# Patient Record
Sex: MALE | Race: WHITE | NOT HISPANIC OR LATINO | Employment: FULL TIME | URBAN - METROPOLITAN AREA
[De-identification: names, ages, dates, MRNs, and addresses within clinical notes are randomized per-mention and may not be internally consistent; named-entity substitution may affect disease eponyms.]

---

## 2022-03-02 ENCOUNTER — TRANSFERRED RECORDS (OUTPATIENT)
Dept: HEALTH INFORMATION MANAGEMENT | Facility: CLINIC | Age: 67
End: 2022-03-02

## 2023-09-21 ENCOUNTER — TRANSFERRED RECORDS (OUTPATIENT)
Dept: HEALTH INFORMATION MANAGEMENT | Facility: CLINIC | Age: 68
End: 2023-09-21

## 2024-01-22 ENCOUNTER — TRANSCRIBE ORDERS (OUTPATIENT)
Dept: OTHER | Age: 69
End: 2024-01-22

## 2024-01-22 DIAGNOSIS — G93.9 DISORDER OF BRAIN, UNSPECIFIED: Primary | ICD-10-CM

## 2024-01-23 NOTE — TELEPHONE ENCOUNTER
Records Requested     January 23, 2024 8:47 AM   [id number]   Facility  [facility]   Outcome 8:53 am Sent request for appointment notes and any imaging to be pushed to PACS. -JA     Records Requested     January 23, 2024 9:02 AM   [id number]   Facility  [facility]    Outcome 9:05 am Sent request for imaging to be pushed to PACS. -JALEN Barnes on 1/29/2024 at 1:48 PM Imaging disc received and sent to 4N for processing. -JA     Records Requested     January 23, 2024 9:02 AM   [id number]   Facility  [facility]   Outcome 9:06 am Sent request for imaging to be pushed to PACS. -JA    Imaging resolved into PACS. -JA     Records Requested     January 23, 2024 9:02 AM   [id number]   Facility  [facility]    Outcome 9:06 am Sent request for imaging to be pushed to PACS. -JALEN Barnes on 2/13/2024 at 4:24 PM Imaging resolved into PACS -JA     RECORDS RECEIVED FROM: Care Everywhere   REASON FOR VISIT: Brain tumor   Date of Appt: 3/22/24 @ 12:30 pm    NOTES (FOR ALL VISITS) STATUS DETAILS   OFFICE NOTE from referring provider Received 1/22/23 (Transcribed Orders) Emily Ordonez APRN CNP @[facility]     OFFICE NOTE from other specialist Care Everywhere 9/28/23 Zechariah Turcios MD  @[facility]    3/7/16, 11/24/15 Iglesia Mojica MD @[facility]     DISCHARGE SUMMARY from hospital Internal 1/20/16-1/23/16 Iglesia Mojica MD @[facility]     OPERATIVE REPORT Internal 1/20/16 Iglesia Mojica MD @[facility] Left Retromastoid Craniotomy And Tumor Resection      MEDICATION LIST Internal    IMAGING  (FOR ALL VISITS)     MRI (HEAD, NECK, SPINE) PACS [facility]  9/21/23 MR Brain  7/5/22 MR Brain    [facility]  7/17/20 MR Brain    [facility]  3/25/16 MR Brain

## 2024-02-13 ENCOUNTER — TELEPHONE (OUTPATIENT)
Dept: NEUROSURGERY | Facility: CLINIC | Age: 69
End: 2024-02-13
Payer: COMMERCIAL

## 2024-02-14 ENCOUNTER — TELEPHONE (OUTPATIENT)
Dept: NEUROSURGERY | Facility: CLINIC | Age: 69
End: 2024-02-14
Payer: COMMERCIAL

## 2024-02-15 NOTE — TELEPHONE ENCOUNTER
RECORDS RECEIVED FROM: Care Everywhere   REASON FOR VISIT: Epidermoid cyst of brain   PROVIDER: Viola Mendez PA-C   DATE OF APPT: 3/15/24 @ 1:30 pm (moved from 3/22/24)   NOTES (FOR ALL VISITS) STATUS DETAILS   OFFICE NOTE from referring provider Received 1/22/23 (Transcribed Orders) Emily Ordonez APRN CNP @[facility]      OFFICE NOTE from other specialist Care Everywhere 9/28/23 Zechariah Turcios MD  @[facility]     3/7/16, 11/24/15 Iglesia Mojica MD @[facility]     DISCHARGE SUMMARY from hospital Internal 1/20/16-1/23/16 Iglesia Mojica MD @[facility]      OPERATIVE REPORT Internal 1/20/16 Iglesia Mojica MD @[facility] Left Retromastoid Craniotomy And Tumor Resection    MEDICATION LIST Internal    IMAGING  (FOR ALL VISITS)     MRI (HEAD, NECK, SPINE) PACS [facility]  9/21/23 MR Brain  7/5/22 MR Brain     [facility]  7/17/20 MR Brain     [facility]  3/25/16 MR Brain

## 2024-03-15 ENCOUNTER — PRE VISIT (OUTPATIENT)
Dept: NEUROSURGERY | Facility: CLINIC | Age: 69
End: 2024-03-15
Payer: COMMERCIAL

## 2024-03-15 ENCOUNTER — OFFICE VISIT (OUTPATIENT)
Dept: NEUROSURGERY | Facility: CLINIC | Age: 69
End: 2024-03-15
Attending: NURSE PRACTITIONER
Payer: COMMERCIAL

## 2024-03-15 VITALS
HEART RATE: 65 BPM | BODY MASS INDEX: 28.14 KG/M2 | WEIGHT: 190 LBS | DIASTOLIC BLOOD PRESSURE: 81 MMHG | SYSTOLIC BLOOD PRESSURE: 166 MMHG | RESPIRATION RATE: 16 BRPM | HEIGHT: 69 IN | OXYGEN SATURATION: 98 %

## 2024-03-15 DIAGNOSIS — G93.0 EPIDERMOID CYST OF BRAIN: Primary | ICD-10-CM

## 2024-03-15 DIAGNOSIS — R26.9 GAIT ABNORMALITY: ICD-10-CM

## 2024-03-15 DIAGNOSIS — G93.9 DISORDER OF BRAIN, UNSPECIFIED: ICD-10-CM

## 2024-03-15 DIAGNOSIS — R26.89 IMBALANCE: ICD-10-CM

## 2024-03-15 PROCEDURE — 99204 OFFICE O/P NEW MOD 45 MIN: CPT | Performed by: PHYSICIAN ASSISTANT

## 2024-03-15 RX ORDER — METOPROLOL SUCCINATE 50 MG/1
TABLET, EXTENDED RELEASE ORAL
COMMUNITY
Start: 2023-09-11

## 2024-03-15 RX ORDER — APIXABAN 5 MG/1
5 TABLET, FILM COATED ORAL 2 TIMES DAILY
COMMUNITY

## 2024-03-15 RX ORDER — TRIAMCINOLONE ACETONIDE 1 MG/G
CREAM TOPICAL
COMMUNITY

## 2024-03-15 RX ORDER — SPIRONOLACTONE 25 MG/1
TABLET ORAL
COMMUNITY
Start: 2023-09-11

## 2024-03-15 ASSESSMENT — PAIN SCALES - GENERAL: PAINLEVEL: NO PAIN (0)

## 2024-03-15 NOTE — PROGRESS NOTES
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 His last follow up in our department was 3/2016 following his second resection with Dr. Iglesia Mojica. At that time he was instructed to follow up in 1 year with repeat MRI. He had a subsequent brain MRI done in  and then annually since that time with reasonably stable findings. Of note, he saw Neurosurgery through [facility] in  who recommended 2 year follow up. Taz is here today with his wife and daughter due to worsening of imbalance over the past 6 months. He has horizontal nystagmus and left sided tinnitus which he also thinks is getting worse. The imbalance became noticeable 2 years ago though again he feels this has progressed in the past 6 months. It is most noticeable when he walks for any distance. He feels he begins to \"veer\" in one direction or the other. He denies dizziness, vertigo, headaches, or facial droop. He's had hearing loss bilaterally and has bilateral hearing aides. The tinnitus is occurring \"80%\" of the time now, which seems worse to him than before. His wife is quite concerned that his epidermoid is responsible for his worsening balance.      He did see Neurology in , they ordered lab work which was all quite reassuring. He did not have LP at that time, that was recommended. He is participating in PT for his imbalance " recently.    Review of Systems:   Pertinent items are noted in HPI or as in patient entered ROS below, remainder of complete ROS is negative.     Active Medications:     Current Outpatient Medications:     acetaminophen (TYLENOL) 500 MG tablet, Take 2 tablets (1,000 mg) by mouth every 8 hours as needed for mild pain, Disp: , Rfl:     AMLODIPINE BESYLATE PO, Take 10 mg by mouth daily, Disp: , Rfl:     cetirizine (ZYRTEC) 10 MG tablet, Take 10 mg by mouth daily, Disp: , Rfl:     dexamethasone (DECADRON) 2 MG tablet, Take 1 tablet (2 mg) by mouth 2 times daily (with meals), Disp: 14 tablet, Rfl: 0    ELIQUIS ANTICOAGULANT 5 MG tablet, Take 5 mg by mouth 2 times daily, Disp: , Rfl:     LOSARTAN POTASSIUM PO, Take 50 mg by mouth daily, Disp: , Rfl:     metoprolol succinate ER (TOPROL XL) 50 MG 24 hr tablet, , Disp: , Rfl:     oxyCODONE (ROXICODONE) 5 MG immediate release tablet, Take 1-2 tablets (5-10 mg) by mouth every 3 hours as needed for moderate to severe pain, Disp: 90 tablet, Rfl: 0    senna-docusate (SENOKOT-S;PERICOLACE) 8.6-50 MG per tablet, Take 1 tablet by mouth 2 times daily, Disp: 60 tablet, Rfl: 0    spironolactone (ALDACTONE) 25 MG tablet, , Disp: , Rfl:     triamcinolone (KENALOG) 0.1 % external cream, , Disp: , Rfl:     triamterene-hydrochlorothiazide (DYAZIDE) 37.5-25 MG per capsule, Take by mouth daily, Disp: , Rfl:       Allergies:   Patient has no known allergies.      Past Medical History:  Past Medical History:   Diagnosis Date    Hypertension      Patient Active Problem List   Diagnosis    Epidermoid cyst of brain    Brain mass        Past Surgical History:  Past Surgical History:   Procedure Laterality Date    COLONOSCOPY      CRANIOTOMY      CRANIOTOMY, RETROMASTOID Left 1/20/2016    Procedure: CRANIOTOMY, RETROMASTOID;  Surgeon: Iglesia Mojica MD;  Location:  OR       Family History:   No family history on file.      Social History:   Social History     Tobacco Use    Smoking status:  "Former   Substance Use Topics    Alcohol use: Yes    Drug use: No        Physical Exam:   BP (!) 166/81 (BP Location: Right arm, Patient Position: Sitting)   Pulse 65   Resp 16   Ht 1.753 m (5' 9\")   Wt 86.2 kg (190 lb)   SpO2 98%   BMI 28.06 kg/m     General: No acute distress.   Eyes: Conjunctivae are normal.  MSK: Moves all extremities.  No obvious deformity.  Neuro: The patient is fully oriented. Speech is normal. Extraocular movements are intact with horizontal nystagmus, most pronounced with left gaze. Facial sensation is intact in V1, V2, V3 distributions. Facial nerve function is normal, rated as a House Brackmann 1. Shoulders are full strength. There is no pronator drift. Full strength in all extremities. Sensation intact throughout. No dysmetria with finger-nose-finger testing. Gait is normal. Cannot complete heel-toe walking 2/2 imbalance.  Psych: Normal mood and affect. Behavior is normal.      Imaging:  I reviewed his recent imaging and compared to previous this appears stable.     \"IMPRESSION:   1. Stable resection changes left cerebellar pontine angle and left cerebellum   with small residual area of restricted diffusion unchanged from prior involving   the inferior left brachium pontis most compatible with some residual epidermoid   cyst.   2. No acute intracranial abnormality seen today.   3. Stable extensive sequelae of chronic microvascular disease. 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 Mojica regarding next steps. I'll be in touch with the patient and family.    Addendum:  Discussed with Dr. Mojica, who recommended Neurology referral, repeat brain MRI, and follow up with Skull Base neurosurgeon, Dr. Tobias. Orders placed, patient notified.       Viola Mendez PA-C  Department of Neurosurgery  Center [facility] Sk[facility] Base [facility] Pituitary [facility]  [facility] [place]

## 2024-03-19 ENCOUNTER — TELEPHONE (OUTPATIENT)
Dept: NEUROSURGERY | Facility: CLINIC | Age: 69
End: 2024-03-19
Payer: COMMERCIAL

## 2024-03-19 NOTE — TELEPHONE ENCOUNTER
[facility] Call Center    Phone Message    May a detailed message be left on voicemail: yes     Reason for Call: Other: Pt called to schedule appt with Viola but didn't realize the clinic hadn't scheduled the MRI for him already so he did not schedule with Viola but is calling the [facility] to [facility] tomorrow and will call clinic back  to schedule. Pt was also confused about the referral from Viola for neurology and wanted to know if it is the same thing they had talked about at his appt. Please call back to give some clarification on reason for neurology referral      Action Taken: Message routed to:  [facility] ([facility] Neurosurgery    Travel Screening: Not Applicable

## 2024-03-21 ENCOUNTER — TELEPHONE (OUTPATIENT)
Dept: NEUROSURGERY | Facility: CLINIC | Age: 69
End: 2024-03-21
Payer: COMMERCIAL

## 2024-03-21 NOTE — TELEPHONE ENCOUNTER
[facility] Call Center    Phone Message    May a detailed message be left on voicemail: yes     Reason for Call: Other: Patient is wanting his MRI orders [facility] to the [facility] to have them [facility] there. Please call patient when the orders have been faxed over.      Action Taken: Other: Neurosurgery    Travel Screening: Not Applicable

## 2024-03-21 NOTE — TELEPHONE ENCOUNTER
Writer informed pt that he needed to see neurology for his gait imbalance and provided their phone number for him to schedule his consult with them.     Pt also concerned about getting his MRI before his appointment with Viola Mendez because he cannot drive to [place], he would like his MRI to be completed at the [facility] in [place]. Writer will fax RFS to [facility].

## 2024-03-21 NOTE — TELEPHONE ENCOUNTER
Left Voicemail (1st Attempt) for the patient to call back and schedule the following:    Appointment type: Return skull base  Provider: Jatinder  Return date: After [facility] at the [facility]  Specialty phone number: [phone]  Additional appointment(s) needed: NA  Additonal Notes: In person or virtual

## 2024-03-22 ENCOUNTER — PRE VISIT (OUTPATIENT)
Dept: NEUROSURGERY | Facility: CLINIC | Age: 69
End: 2024-03-22

## 2024-03-27 NOTE — TELEPHONE ENCOUNTER
RFS faxed to [facility] [phone]    DM (diabetes mellitus)    MI (myocardial infarction)

## 2024-04-30 ENCOUNTER — TRANSFERRED RECORDS (OUTPATIENT)
Dept: HEALTH INFORMATION MANAGEMENT | Facility: CLINIC | Age: 69
End: 2024-04-30
Payer: COMMERCIAL

## 2024-05-07 ENCOUNTER — CARE COORDINATION (OUTPATIENT)
Dept: NEUROSURGERY | Facility: CLINIC | Age: 69
End: 2024-05-07
Payer: COMMERCIAL

## 2024-05-08 ENCOUNTER — CARE COORDINATION (OUTPATIENT)
Dept: NEUROSURGERY | Facility: CLINIC | Age: 69
End: 2024-05-08
Payer: COMMERCIAL

## 2024-05-08 ENCOUNTER — OFFICE VISIT (OUTPATIENT)
Dept: NEUROSURGERY | Facility: CLINIC | Age: 69
End: 2024-05-08
Payer: COMMERCIAL

## 2024-05-08 VITALS
SYSTOLIC BLOOD PRESSURE: 165 MMHG | HEIGHT: 69 IN | BODY MASS INDEX: 28.29 KG/M2 | DIASTOLIC BLOOD PRESSURE: 96 MMHG | WEIGHT: 191 LBS | OXYGEN SATURATION: 99 % | HEART RATE: 59 BPM

## 2024-05-08 DIAGNOSIS — G93.0 EPIDERMOID CYST OF BRAIN: Primary | ICD-10-CM

## 2024-05-08 PROCEDURE — 99215 OFFICE O/P EST HI 40 MIN: CPT | Performed by: NEUROLOGICAL SURGERY

## 2024-05-08 ASSESSMENT — PAIN SCALES - GENERAL: PAINLEVEL: NO PAIN (0)

## 2024-05-08 NOTE — PROGRESS NOTES
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 Bah,  Dr. Mojica), follow up visit     Dear Dr. Turcios,     It was a pleasure to see Mr. Watts back in the Center for Skull Base and Pituitary Surgery today in follow up.  As you recall, Mr. Watts is a 68 year old right-handed male with a history of hypertension, atrial fibrillation (on Eliquis), sensorineural bilateral hearing loss, and alcohol use who is here today for follow up regarding a left cerebellopontine angle epidermoid cyst which was resected in  and again in  for recurrence. His last follow up in our department was 3/2016 following his second resection with Dr. Iglesia Mojica. At that time he was instructed to follow up in 1 year with repeat MRI. He had a subsequent brain MRI done in  and then annually since that time with reasonably stable findings. Of note, he saw Neurosurgery through [facility] in  who recommended 2 year follow up.     Today her reports persistent hearing loss, balance issues, as well as headaches. Has also been dealing with dysphagia following his second left CPA resection in . He states that is vocal cord was paralyzed after the procedure.  He is also having double vision and his eye doctor noted nystagmus.  He was seen by a neurologist and told he has neuropathy in his legs.      Review of Systems:   Pertinent items are noted in HPI, remainder of complete ROS is negative.      Active Medications:   Acetaminophen, Amlodipine, Cetirizine, Dexamethasone, Eliquis, Losartan, Metoprolol, Oxycodone, Senna-Docusate, Spironolactone, Triamcinolone, Triamterene-Hydrochlorothiazide     Allergies:   No know drug allergies     Past Medical History:   HTN     Past Surgical History:   Colonoscopy, Craniotomy; retromastoid,  "    Family History:   No reported family history      Social History:   Born and raised in [place].  with 3 kids who are in the area. 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      Physical Exam:   BP (!) 165/96 (BP Location: Left arm, Patient Position: Sitting, Cuff Size: Adult Large)   Pulse 59   Ht 1.753 m (5' 9\")   Wt 86.6 kg (191 lb)   SpO2 99%   BMI 28.21 kg/m       General: No acute distress.   Head: No signs of trauma.    Eyes: Conjunctivae are normal.  Mouth/Throat: Oropharynx moist.  Neck: Normal range of motion.    Resp: No respiratory distress.   MSK: Moves all extremities.  No obvious deformity.  Neuro: The patient is fully oriented and quite pleasant. Speech normal. Double vision.  Rotatory nystagmus accentuated with lateral gaze and upgaze.  Facial sensation is intact in V1, V2, V3 distributions. Facial nerve function is normal, rated as a House Brackmann 1, without synkinesis.  Hearing is absent on the left. Palate is symmetric. Shoulders are full strength. Tongue is midline. Palate deviates to the right.There is no pronator drift. Full strength in all extremities. Sensation intact throughout. No dysmetria with finger-nose-finger testing. Gait has is normal-based but is slowed and he cannot tandem walk.   Psych: Normal mood and affect. Behavior is normal.    Incision: Theris a reverse hockey stick incision behind the left ear extending to the midline.    Imaging:  We reviewed his recent MRI from 9/21/2023 and compared this to prior studies.  There is a small residual diffusion restricting mass along the aspect of the left medulla and bottom of the cerebellar peduncle with limited mass effect.  This is much improved compared to the preoperative MRI in 2015.    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  Given how small the lesion is and the lack of progress over the past year, it was hard to definitively know if this is related to his symptoms.  The timeline is unclear.  I communicated that I did not think that operating on this was likely to relieve any of his symptoms.  They seem to understand this after we reviewed the MRI.    He did have a more recent MRI but we are unable to locate this.  We will try again or obtain new MRI imaging so we can review this.    If the recent MRI is reassuring, I would like to have him see my team back in 1 year with an updated appointment and MRI with my partner Viola Mendez.    He [facility] to [facility] for his workup of neuropathy.  He is having some choking episodes that are mildly worse than years past, so given his vocal cord paralysis, I recommended that he revisit with ENT at the [facility] who have seen him before so that they can perform another laryngoscopy to compare to his prior results.  He will pursue this at his [facility] in [place].  I encouraged him to contact us should any questions or concerns arise in advance of his next appointment      It has been a pleasure to participate in the care of your patient. Please feel free to contact us if we may be of any assistance for Mr. Watts.    Frederic Tobias MD   Department of Neurosurgery  Center for Skull Base and Pituitary Surgery  [facility]      40 minutes spent on the date of the encounter doing chart review, review of outside records, review of test results, interpretation of tests, patient visit, documentation and discussion with other provider(s)         Scribe Disclosure:   I, Guillermo Stearns, am serving as a scribe; to document services personally performed by Frederic Tobias MD -based on data collection and the provider's statements to me.

## 2024-05-08 NOTE — LETTER
2024       RE: Taz Watts   [address] [place] [address]       Dear Colleague,    Thank you for referring your patient, Taz Watts, to the [facility] [place] at [facility]. Please see a copy of my visit note below.    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 Bah,  Dr. Mojica), follow up visit     Dear Dr. Turcios,     It was a pleasure to see Mr. Watts back in the Center for Skull Base and Pituitary Surgery today in follow up.  As you recall, Mr. Watts is a 68 year old right-handed male with a history of hypertension, atrial fibrillation (on Eliquis), sensorineural bilateral hearing loss, and alcohol use who is here today for follow up regarding a left cerebellopontine angle epidermoid cyst which was resected in  and again in  for recurrence. His last follow up in our department was 3/2016 following his second resection with Dr. Iglesia Mojica. At that time he was instructed to follow up in 1 year with repeat MRI. He had a subsequent brain MRI done in  and then annually since that time with reasonably stable findings. Of note, he saw Neurosurgery through [facility] in  who recommended 2 year follow up.     Today her reports persistent hearing loss, balance issues, as well as headaches. Has also been dealing with dysphagia following his second left CPA resection in . He states that is vocal cord was paralyzed after the procedure.  He is also having double vision and his eye doctor noted nystagmus.  He was seen by a neurologist and told he has neuropathy in his legs.      Review of Systems:   Pertinent items are noted in HPI, remainder of complete ROS is negative.      Active Medications:  "  Acetaminophen, Amlodipine, Cetirizine, Dexamethasone, Eliquis, Losartan, Metoprolol, Oxycodone, Senna-Docusate, Spironolactone, Triamcinolone, Triamterene-Hydrochlorothiazide     Allergies:   No know drug allergies     Past Medical History:   HTN     Past Surgical History:   Colonoscopy, Craniotomy; retromastoid,     Family History:   No reported family history      Social History:   Born and raised in [place].  with 3 kids who are in the area. 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      Physical Exam:   BP (!) 165/96 (BP Location: Left arm, Patient Position: Sitting, Cuff Size: Adult Large)   Pulse 59   Ht 1.753 m (5' 9\")   Wt 86.6 kg (191 lb)   SpO2 99%   BMI 28.21 kg/m       General: No acute distress.   Head: No signs of trauma.    Eyes: Conjunctivae are normal.  Mouth/Throat: Oropharynx moist.  Neck: Normal range of motion.    Resp: No respiratory distress.   MSK: Moves all extremities.  No obvious deformity.  Neuro: The patient is fully oriented and quite pleasant. Speech normal. Double vision.  Rotatory nystagmus accentuated with lateral gaze and upgaze.  Facial sensation is intact in V1, V2, V3 distributions. Facial nerve function is normal, rated as a House Brackmann 1, without synkinesis.  Hearing is absent on the left. Palate is symmetric. Shoulders are full strength. Tongue is midline. Palate deviates to the right.There is no pronator drift. Full strength in all extremities. Sensation intact throughout. No dysmetria with finger-nose-finger testing. Gait has is normal-based but is slowed and he cannot tandem walk.   Psych: Normal mood and affect. Behavior is normal.    Incision: Theris a reverse hockey stick incision behind the left ear extending to the midline.    Imaging:  We reviewed his recent MRI from 9/21/2023 and compared this to prior studies.  There is a small residual diffusion restricting mass along the aspect of the left medulla and bottom of the cerebellar " peduncle with limited mass effect.  This is much improved compared to the preoperative MRI in 2015.    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  Given how small the lesion is and the lack of progress over the past year, it was hard to definitively know if this is related to his symptoms.  The timeline is unclear.  I communicated that I did not think that operating on this was likely to relieve any of his symptoms.  They seem to understand this after we reviewed the MRI.    He did have a more recent MRI but we are unable to locate this.  We will try again or obtain new MRI imaging so we can review this.    If the recent MRI is reassuring, I would like to have him see my team back in 1 year with an updated appointment and MRI with my partner Viola Mendez.    He continues to [facility] the [facility] for his workup of neuropathy.  He is having some choking episodes that are mildly worse than years past, so given his vocal cord paralysis, I recommended that he revisit with ENT at the [facility] who have seen him before so that they can perform another laryngoscopy to compare to his prior results.  He will pursue this at his [facility] in [place].  I encouraged him to contact us should any questions or concerns arise in advance of his next appointment      It has been a pleasure to participate in the care of your patient. Please feel free to contact us if we may be of any assistance for Mr. Watts.       40 minutes spent on the date of the encounter doing chart review, review of outside records, review of test results, interpretation of tests, patient visit, documentation and discussion with other provider(s)         Scribe Disclosure:   I, Guillermo Stearns, am serving as a scribe; to document services personally  performed by Frederic Tobias MD -based on data collection and the provider's statements to me.         Again, thank you for allowing me to participate in the care of your patient.      Sincerely,    Frederic Tobias MD

## 2024-05-08 NOTE — LETTER
[facility]  [facility] [address]  3RD FLOOR  [facility] [address]  Phone: [phone]  Fax: [phone]          2024    RE:   Taz Watts   [address] [place] [address]      Dear Colleague,    Thank you for referring your patient, Taz Watts, to the [facility]. Please see a copy of my visit note below.      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 Bah,  Dr. Mojica), follow up visit     Dear Dr. Turcios,     It was a pleasure to see Mr. Watts back in the [facility] today in follow up.  As you recall, Mr. Watts is a 68 year old right-handed male with a history of hypertension, atrial fibrillation (on Eliquis), sensorineural bilateral hearing loss, and alcohol use who is here today for follow up regarding a left cerebellopontine angle epidermoid cyst which was resected in  and again in  for recurrence. His last follow up in our department was 3/2016 following his second resection with Dr. Iglesia Mojica. At that time he was instructed to follow up in 1 year with repeat MRI. He had a subsequent brain MRI done in  and then annually since that time with reasonably stable findings. Of note, he saw Neurosurgery through [facility] in  who recommended 2 year follow up.     Today her reports persistent hearing loss, balance issues, as well as headaches. Has also been dealing with dysphagia following his second left CPA resection in . He states that is vocal cord was paralyzed after the procedure.  He is also having double vision and his eye doctor noted nystagmus.  He was seen by a neurologist and told he has  "neuropathy in his legs.      Review of Systems:   Pertinent items are noted in HPI, remainder of complete ROS is negative.      Active Medications:   Acetaminophen, Amlodipine, Cetirizine, Dexamethasone, Eliquis, Losartan, Metoprolol, Oxycodone, Senna-Docusate, Spironolactone, Triamcinolone, Triamterene-Hydrochlorothiazide     Allergies:   No know drug allergies     Past Medical History:   HTN     Past Surgical History:   Colonoscopy, Craniotomy; retromastoid,     Family History:   No reported family history      Social History:   Born and raised in [place].  with 3 kids who are in the area. Formerly in the [employer] [employer]. Now he installs sheet-rock.      Physical Exam:   BP (!) 165/96 (BP Location: Left arm, Patient Position: Sitting, Cuff Size: Adult Large)   Pulse 59   Ht 1.753 m (5' 9\")   Wt 86.6 kg (191 lb)   SpO2 99%   BMI 28.21 kg/m       General: No acute distress.   Head: No signs of trauma.    Eyes: Conjunctivae are normal.  Mouth/Throat: Oropharynx moist.  Neck: Normal range of motion.    Resp: No respiratory distress.   MSK: Moves all extremities.  No obvious deformity.  Neuro: The patient is fully oriented and quite pleasant. Speech normal. Double vision.  Rotatory nystagmus accentuated with lateral gaze and upgaze.  Facial sensation is intact in V1, V2, V3 distributions. Facial nerve function is normal, rated as a House Brackmann 1, without synkinesis.  Hearing is absent on the left. Palate is symmetric. Shoulders are full strength. Tongue is midline. Palate deviates to the right.There is no pronator drift. Full strength in all extremities. Sensation intact throughout. No dysmetria with finger-nose-finger testing. Gait has is normal-based but is slowed and he cannot tandem walk.   Psych: Normal mood and affect. Behavior is normal.    Incision: Theris a reverse hockey stick incision behind the left ear extending to the midline.    Imaging:  We reviewed his recent MRI from 9/21/2023 and " compared this to prior studies.  There is a small residual diffusion restricting mass along the aspect of the left medulla and bottom of the cerebellar peduncle with limited mass effect.  This is much improved compared to the preoperative MRI in 2015.    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  Given how small the lesion is and the lack of progress over the past year, it was hard to definitively know if this is related to his symptoms.  The timeline is unclear.  I communicated that I did not think that operating on this was likely to relieve any of his symptoms.  They seem to understand this after we reviewed the MRI.    He did have a more recent MRI but we are unable to locate this.  We will try again or obtain new MRI imaging so we can review this.    If the recent MRI is reassuring, I would like to have him see my team back in 1 year with an updated appointment and MRI with my partner Viola Mendez.    [facility].  He is having some choking episodes that are mildly worse than years past, so given his vocal cord paralysis, I recommended that he revisit [facility] who have [facility] before so that they can perform another laryngoscopy to compare to his prior results.  [facility] [place].  I encouraged him to contact us should any questions or concerns arise in advance of his next appointment      It has been a pleasure to participate in the care of your patient. Please feel free to contact us if we may be of any assistance for Mr. Watts.    Frederic Tobias MD   Department of Neurosurgery  [facility]  [facility]      40 minutes spent on the date of the  encounter doing chart review, review of outside records, review of test results, interpretation of tests, patient visit, documentation and discussion with other provider(s)         Scribe Disclosure:   I, Guillermo Stearns, am serving as a scribe; to document services personally performed by Frederic Tobias MD -based on data collection and the provider's statements to me.         Again, thank you for allowing me to participate in the care of your patient.      Sincerely,    Frederic Tobias MD

## 2024-05-08 NOTE — PATIENT INSTRUCTIONS
You were seen today with Dr. Frederic Tobias.    Next steps:  Return to clinic in 1 year to see MASHA Gabriel  MRI needs to be completed prior to your appointment      How to Contact Us  Send a Sefaira message to your provider.   Call the clinic - your call will be routed appropriately.   Neurosurgery Clinic: [phone]  To speak directly to an RN Care Coordinator:  Vera RN: [phone]  Tila RN: [phone]    Note: We do our best to check voicemail frequently throughout the day and make every effort to return calls within 1-2 business days. For urgent matters, please use the general clinic phone numbers listed above.

## 2024-05-16 ENCOUNTER — CARE COORDINATION (OUTPATIENT)
Dept: NEUROSURGERY | Facility: CLINIC | Age: 69
End: 2024-05-16
Payer: COMMERCIAL

## 2024-05-16 NOTE — PROGRESS NOTES
Writer [facility] to be pushed to [facility], reports faxed to [phone].     Left voice mail with [facility] for the above.     Mildred Bailey LPN  Neurosurgery

## 2024-05-16 NOTE — PROGRESS NOTES
Writer obtained [facility], resolved to [facility], Report faxed to HIM. 04/30/2024  MR Brain     Mildred Bailey LPN  Neurosurgery